# Patient Record
Sex: FEMALE | Race: WHITE | NOT HISPANIC OR LATINO | Employment: FULL TIME | URBAN - METROPOLITAN AREA
[De-identification: names, ages, dates, MRNs, and addresses within clinical notes are randomized per-mention and may not be internally consistent; named-entity substitution may affect disease eponyms.]

---

## 2023-12-08 ENCOUNTER — LAB REQUISITION (OUTPATIENT)
Dept: LAB | Facility: HOSPITAL | Age: 43
End: 2023-12-08
Payer: COMMERCIAL

## 2023-12-08 DIAGNOSIS — R30.0 DYSURIA: ICD-10-CM

## 2023-12-08 PROCEDURE — 87086 URINE CULTURE/COLONY COUNT: CPT

## 2023-12-10 LAB — BACTERIA UR CULT: NO GROWTH

## 2024-01-21 ENCOUNTER — HOSPITAL ENCOUNTER (EMERGENCY)
Facility: HOSPITAL | Age: 44
Discharge: HOME | End: 2024-01-21
Attending: STUDENT IN AN ORGANIZED HEALTH CARE EDUCATION/TRAINING PROGRAM
Payer: COMMERCIAL

## 2024-01-21 ENCOUNTER — APPOINTMENT (OUTPATIENT)
Dept: CARDIOLOGY | Facility: HOSPITAL | Age: 44
End: 2024-01-21
Payer: COMMERCIAL

## 2024-01-21 ENCOUNTER — APPOINTMENT (OUTPATIENT)
Dept: RADIOLOGY | Facility: HOSPITAL | Age: 44
End: 2024-01-21
Payer: COMMERCIAL

## 2024-01-21 VITALS
OXYGEN SATURATION: 100 % | BODY MASS INDEX: 25.92 KG/M2 | RESPIRATION RATE: 15 BRPM | HEIGHT: 69 IN | DIASTOLIC BLOOD PRESSURE: 89 MMHG | TEMPERATURE: 97.2 F | HEART RATE: 94 BPM | SYSTOLIC BLOOD PRESSURE: 118 MMHG | WEIGHT: 175 LBS

## 2024-01-21 DIAGNOSIS — R07.9 CHEST PAIN, UNSPECIFIED TYPE: Primary | ICD-10-CM

## 2024-01-21 LAB
ALBUMIN SERPL BCP-MCNC: 3.7 G/DL (ref 3.4–5)
ALP SERPL-CCNC: 44 U/L (ref 33–110)
ALT SERPL W P-5'-P-CCNC: 18 U/L (ref 7–45)
ANION GAP SERPL CALC-SCNC: 13 MMOL/L (ref 10–20)
AST SERPL W P-5'-P-CCNC: 19 U/L (ref 9–39)
BASOPHILS # BLD AUTO: 0.05 X10*3/UL (ref 0–0.1)
BASOPHILS NFR BLD AUTO: 0.9 %
BILIRUB SERPL-MCNC: 0.4 MG/DL (ref 0–1.2)
BUN SERPL-MCNC: 12 MG/DL (ref 6–23)
CALCIUM SERPL-MCNC: 8.3 MG/DL (ref 8.6–10.3)
CARDIAC TROPONIN I PNL SERPL HS: 4 NG/L (ref 0–13)
CARDIAC TROPONIN I PNL SERPL HS: 5 NG/L (ref 0–13)
CHLORIDE SERPL-SCNC: 106 MMOL/L (ref 98–107)
CO2 SERPL-SCNC: 25 MMOL/L (ref 21–32)
CREAT SERPL-MCNC: 0.67 MG/DL (ref 0.5–1.05)
D DIMER PPP FEU-MCNC: 301 NG/ML FEU
EGFRCR SERPLBLD CKD-EPI 2021: >90 ML/MIN/1.73M*2
EOSINOPHIL # BLD AUTO: 0.43 X10*3/UL (ref 0–0.7)
EOSINOPHIL NFR BLD AUTO: 8.1 %
ERYTHROCYTE [DISTWIDTH] IN BLOOD BY AUTOMATED COUNT: 14.9 % (ref 11.5–14.5)
GLUCOSE SERPL-MCNC: 137 MG/DL (ref 74–99)
HCT VFR BLD AUTO: 35.1 % (ref 36–46)
HGB BLD-MCNC: 11.4 G/DL (ref 12–16)
HOLD SPECIMEN: NORMAL
IMM GRANULOCYTES # BLD AUTO: 0.01 X10*3/UL (ref 0–0.7)
IMM GRANULOCYTES NFR BLD AUTO: 0.2 % (ref 0–0.9)
LYMPHOCYTES # BLD AUTO: 0.81 X10*3/UL (ref 1.2–4.8)
LYMPHOCYTES NFR BLD AUTO: 15.3 %
MAGNESIUM SERPL-MCNC: 1.81 MG/DL (ref 1.6–2.4)
MCH RBC QN AUTO: 29.2 PG (ref 26–34)
MCHC RBC AUTO-ENTMCNC: 32.5 G/DL (ref 32–36)
MCV RBC AUTO: 90 FL (ref 80–100)
MONOCYTES # BLD AUTO: 0.7 X10*3/UL (ref 0.1–1)
MONOCYTES NFR BLD AUTO: 13.2 %
NEUTROPHILS # BLD AUTO: 3.31 X10*3/UL (ref 1.2–7.7)
NEUTROPHILS NFR BLD AUTO: 62.3 %
NRBC BLD-RTO: 0 /100 WBCS (ref 0–0)
PHOSPHATE SERPL-MCNC: 1.6 MG/DL (ref 2.5–4.9)
PLATELET # BLD AUTO: 269 X10*3/UL (ref 150–450)
POTASSIUM SERPL-SCNC: 3 MMOL/L (ref 3.5–5.3)
PROT SERPL-MCNC: 6 G/DL (ref 6.4–8.2)
RBC # BLD AUTO: 3.91 X10*6/UL (ref 4–5.2)
SODIUM SERPL-SCNC: 141 MMOL/L (ref 136–145)
TSH SERPL-ACNC: 1.76 MIU/L (ref 0.44–3.98)
WBC # BLD AUTO: 5.3 X10*3/UL (ref 4.4–11.3)

## 2024-01-21 PROCEDURE — 99284 EMERGENCY DEPT VISIT MOD MDM: CPT | Performed by: STUDENT IN AN ORGANIZED HEALTH CARE EDUCATION/TRAINING PROGRAM

## 2024-01-21 PROCEDURE — 71045 X-RAY EXAM CHEST 1 VIEW: CPT

## 2024-01-21 PROCEDURE — 85025 COMPLETE CBC W/AUTO DIFF WBC: CPT | Performed by: STUDENT IN AN ORGANIZED HEALTH CARE EDUCATION/TRAINING PROGRAM

## 2024-01-21 PROCEDURE — 71045 X-RAY EXAM CHEST 1 VIEW: CPT | Performed by: RADIOLOGY

## 2024-01-21 PROCEDURE — 36415 COLL VENOUS BLD VENIPUNCTURE: CPT | Performed by: STUDENT IN AN ORGANIZED HEALTH CARE EDUCATION/TRAINING PROGRAM

## 2024-01-21 PROCEDURE — 83735 ASSAY OF MAGNESIUM: CPT | Performed by: STUDENT IN AN ORGANIZED HEALTH CARE EDUCATION/TRAINING PROGRAM

## 2024-01-21 PROCEDURE — 99283 EMERGENCY DEPT VISIT LOW MDM: CPT | Mod: 25

## 2024-01-21 PROCEDURE — 84443 ASSAY THYROID STIM HORMONE: CPT | Performed by: STUDENT IN AN ORGANIZED HEALTH CARE EDUCATION/TRAINING PROGRAM

## 2024-01-21 PROCEDURE — 80053 COMPREHEN METABOLIC PANEL: CPT | Performed by: STUDENT IN AN ORGANIZED HEALTH CARE EDUCATION/TRAINING PROGRAM

## 2024-01-21 PROCEDURE — 85379 FIBRIN DEGRADATION QUANT: CPT | Performed by: STUDENT IN AN ORGANIZED HEALTH CARE EDUCATION/TRAINING PROGRAM

## 2024-01-21 PROCEDURE — 2500000001 HC RX 250 WO HCPCS SELF ADMINISTERED DRUGS (ALT 637 FOR MEDICARE OP): Performed by: STUDENT IN AN ORGANIZED HEALTH CARE EDUCATION/TRAINING PROGRAM

## 2024-01-21 PROCEDURE — 84100 ASSAY OF PHOSPHORUS: CPT | Performed by: STUDENT IN AN ORGANIZED HEALTH CARE EDUCATION/TRAINING PROGRAM

## 2024-01-21 PROCEDURE — 93005 ELECTROCARDIOGRAM TRACING: CPT

## 2024-01-21 PROCEDURE — 84484 ASSAY OF TROPONIN QUANT: CPT | Performed by: STUDENT IN AN ORGANIZED HEALTH CARE EDUCATION/TRAINING PROGRAM

## 2024-01-21 RX ORDER — SODIUM,POTASSIUM PHOSPHATES 280-250MG
1 POWDER IN PACKET (EA) ORAL ONCE
Status: COMPLETED | OUTPATIENT
Start: 2024-01-21 | End: 2024-01-21

## 2024-01-21 RX ADMIN — POTASSIUM & SODIUM PHOSPHATES POWDER PACK 280-160-250 MG 1 PACKET: 280-160-250 PACK at 22:41

## 2024-01-21 ASSESSMENT — COLUMBIA-SUICIDE SEVERITY RATING SCALE - C-SSRS
6. HAVE YOU EVER DONE ANYTHING, STARTED TO DO ANYTHING, OR PREPARED TO DO ANYTHING TO END YOUR LIFE?: NO
2. HAVE YOU ACTUALLY HAD ANY THOUGHTS OF KILLING YOURSELF?: NO
1. IN THE PAST MONTH, HAVE YOU WISHED YOU WERE DEAD OR WISHED YOU COULD GO TO SLEEP AND NOT WAKE UP?: NO

## 2024-01-21 ASSESSMENT — PAIN DESCRIPTION - LOCATION: LOCATION: CHEST

## 2024-01-21 ASSESSMENT — PAIN SCALES - GENERAL
PAINLEVEL_OUTOF10: 0 - NO PAIN
PAINLEVEL_OUTOF10: 4

## 2024-01-21 ASSESSMENT — PAIN - FUNCTIONAL ASSESSMENT: PAIN_FUNCTIONAL_ASSESSMENT: 0-10

## 2024-01-22 LAB
ATRIAL RATE: 90 BPM
P AXIS: 23 DEGREES
P OFFSET: 192 MS
P ONSET: 159 MS
PR INTERVAL: 118 MS
Q ONSET: 218 MS
QRS COUNT: 15 BEATS
QRS DURATION: 94 MS
QT INTERVAL: 424 MS
QTC CALCULATION(BAZETT): 518 MS
QTC FREDERICIA: 485 MS
R AXIS: 55 DEGREES
T AXIS: 55 DEGREES
T OFFSET: 430 MS
VENTRICULAR RATE: 90 BPM

## 2024-01-23 ENCOUNTER — OFFICE VISIT (OUTPATIENT)
Dept: PRIMARY CARE | Facility: CLINIC | Age: 44
End: 2024-01-23
Payer: COMMERCIAL

## 2024-01-23 VITALS
BODY MASS INDEX: 24.14 KG/M2 | HEART RATE: 120 BPM | TEMPERATURE: 97.1 F | DIASTOLIC BLOOD PRESSURE: 68 MMHG | OXYGEN SATURATION: 98 % | HEIGHT: 69 IN | SYSTOLIC BLOOD PRESSURE: 104 MMHG | WEIGHT: 163 LBS

## 2024-01-23 DIAGNOSIS — M25.551 RIGHT HIP PAIN: Primary | ICD-10-CM

## 2024-01-23 DIAGNOSIS — R31.9 HEMATURIA, UNSPECIFIED TYPE: ICD-10-CM

## 2024-01-23 DIAGNOSIS — K21.9 GASTROESOPHAGEAL REFLUX DISEASE WITHOUT ESOPHAGITIS: ICD-10-CM

## 2024-01-23 DIAGNOSIS — I48.0 PAF (PAROXYSMAL ATRIAL FIBRILLATION) (MULTI): ICD-10-CM

## 2024-01-23 PROCEDURE — 1036F TOBACCO NON-USER: CPT | Performed by: FAMILY MEDICINE

## 2024-01-23 PROCEDURE — 99204 OFFICE O/P NEW MOD 45 MIN: CPT | Performed by: FAMILY MEDICINE

## 2024-01-23 RX ORDER — ESOMEPRAZOLE MAGNESIUM 40 MG/1
40 CAPSULE, DELAYED RELEASE ORAL EVERY 24 HOURS
Qty: 90 CAPSULE | Refills: 3 | Status: CANCELLED | OUTPATIENT
Start: 2024-01-23

## 2024-01-23 RX ORDER — ESOMEPRAZOLE MAGNESIUM 40 MG/1
CAPSULE, DELAYED RELEASE ORAL EVERY 24 HOURS
COMMUNITY
Start: 2023-02-28 | End: 2024-01-24 | Stop reason: SDUPTHER

## 2024-01-23 RX ORDER — METOPROLOL SUCCINATE 25 MG/1
25 TABLET, EXTENDED RELEASE ORAL DAILY
Qty: 90 TABLET | Refills: 3 | Status: CANCELLED | OUTPATIENT
Start: 2024-01-23

## 2024-01-23 RX ORDER — IBUPROFEN 800 MG/1
800 TABLET ORAL EVERY 8 HOURS PRN
Qty: 90 TABLET | Refills: 3 | Status: CANCELLED | OUTPATIENT
Start: 2024-01-23

## 2024-01-23 RX ORDER — METOPROLOL SUCCINATE 25 MG/1
TABLET, EXTENDED RELEASE ORAL
COMMUNITY
Start: 2021-12-13 | End: 2024-01-24 | Stop reason: SDUPTHER

## 2024-01-23 RX ORDER — ASPIRIN 81 MG/1
81 TABLET ORAL
COMMUNITY
Start: 2021-12-13 | End: 2024-01-24 | Stop reason: SDUPTHER

## 2024-01-23 RX ORDER — IBUPROFEN 800 MG/1
TABLET ORAL
COMMUNITY
Start: 2022-07-21 | End: 2024-01-24 | Stop reason: SDUPTHER

## 2024-01-23 NOTE — PROGRESS NOTES
Subjective   Patient ID: Vandana Rankin is a 43 y.o. female who presents for Headache (STABBING/), Chest Pain ([facility]-CHEST XRAYS, LABS, EKG. CONTINUED SYMPTOMS-HX OF HEART PROBLEMS.  ALSO C/O ITCHY SKIN PRIOR TO EPISODES. UNDER CHEST AND INTO BACK. DXD WITH AFIB IN THE PAST. Episodes also noted after eating.), and Med Refill (Metoprolol- out of meds for 30 days currently BP in office today 104/68).    HPI JUST IN ER WITH CP NEGATIVE WORK UP YESTERDAY  REPORTEDLY AFIB AND ON B BLOCKER   PATIENT PRESENTS NEW TO GET ESTABLISHED WITH NUMEROUS ISSUES /stabbing headaches and chest pains   Orthopedic issues and others   JUST IN ER /LABS AND OTHER TESTING REVIEWED     Review of Systems   Constitutional:  Positive for fatigue. Negative for activity change, appetite change, fever and unexpected weight change.   HENT:  Negative for congestion, ear pain, postnasal drip, rhinorrhea, sinus pressure and sore throat.    Eyes:  Negative for discharge, itching and visual disturbance.   Respiratory:  Negative for cough, shortness of breath and wheezing.    Cardiovascular:  Positive for chest pain. Negative for palpitations and leg swelling.   Gastrointestinal:  Positive for abdominal distention and abdominal pain. Negative for blood in stool, constipation, diarrhea, nausea and vomiting.        Gerd and off her rx meds   No scopes ever   Endocrine: Negative for cold intolerance, heat intolerance and polyuria.   Genitourinary:  Positive for hematuria. Negative for dysuria and flank pain.        Hematuria found in er and this repeated   BLOOD IN URINE PATIENT IS HAVING MENSES (I HAVE A TAMPON IN)   Musculoskeletal:  Positive for arthralgias. Negative for gait problem, joint swelling and myalgias.        RIGHT HIP PAIN    Skin:  Negative for rash.   Allergic/Immunologic: Negative for environmental allergies and food allergies.   Neurological:  Positive for headaches. Negative for dizziness, syncope, weakness, light-headedness and  "numbness.   Psychiatric/Behavioral:  Negative for dysphoric mood and sleep disturbance. The patient is nervous/anxious.         RUSHED SPEECH AND OFTEN FROM ONE ISSUE TO NEXT        Objective   /68   Pulse (!) 120   Temp 36.2 °C (97.1 °F)   Ht 1.746 m (5' 8.75\")   Wt 73.9 kg (163 lb)   SpO2 98%   BMI 24.25 kg/m²     Physical Exam  Vitals and nursing note reviewed.   Constitutional:       Appearance: Normal appearance.   HENT:      Head: Normocephalic.      Mouth/Throat:      Mouth: Mucous membranes are moist.   Cardiovascular:      Rate and Rhythm: Normal rate. Rhythm irregular.      Pulses: Normal pulses.      Heart sounds: Normal heart sounds. No murmur heard.     No friction rub. No gallop.   Pulmonary:      Effort: Pulmonary effort is normal. No respiratory distress.      Breath sounds: Normal breath sounds. No wheezing.   Abdominal:      General: Bowel sounds are normal. There is no distension.      Palpations: Abdomen is soft.      Tenderness: There is abdominal tenderness. There is no right CVA tenderness or left CVA tenderness.      Comments: MID EPIGASTRIC TENDERNESS AND AROUND RIBS    Musculoskeletal:         General: No deformity. Normal range of motion.   Skin:     General: Skin is warm and dry.      Capillary Refill: Capillary refill takes less than 2 seconds.   Neurological:      General: No focal deficit present.      Mental Status: She is alert and oriented to person, place, and time.   Psychiatric:      Comments: ANXIOUS AFFECT AND MOOD   REDIRECTED PATIENT OFTEN TO HER MAIN ISSUES FOR TODAY  I WILL FOLLOW SOON AND ADDRESS OTHERS   MEDICATION REFILLED          Assessment/Plan   Diagnoses and all orders for this visit:  Right hip pain  -     XR hip right with pelvis when performed 1 view; Future  Gastroesophageal reflux disease without esophagitis  PAF (paroxysmal atrial fibrillation) (CMS/[facility])  Hematuria, unspecified type  -     POCT UA Automated manually resulted         "

## 2024-01-24 DIAGNOSIS — R51.9 NONINTRACTABLE HEADACHE, UNSPECIFIED CHRONICITY PATTERN, UNSPECIFIED HEADACHE TYPE: ICD-10-CM

## 2024-01-24 DIAGNOSIS — K21.9 GASTROESOPHAGEAL REFLUX DISEASE WITHOUT ESOPHAGITIS: Primary | ICD-10-CM

## 2024-01-24 DIAGNOSIS — I48.0 PAF (PAROXYSMAL ATRIAL FIBRILLATION) (MULTI): ICD-10-CM

## 2024-01-25 RX ORDER — METOPROLOL SUCCINATE 25 MG/1
25 TABLET, EXTENDED RELEASE ORAL 2 TIMES DAILY
Qty: 60 TABLET | Refills: 2 | Status: SHIPPED | OUTPATIENT
Start: 2024-01-25 | End: 2024-01-26 | Stop reason: SDUPTHER

## 2024-01-25 RX ORDER — ASPIRIN 81 MG/1
81 TABLET ORAL
Qty: 30 TABLET | Refills: 2 | Status: SHIPPED | OUTPATIENT
Start: 2024-01-25 | End: 2024-03-14 | Stop reason: SDUPTHER

## 2024-01-25 RX ORDER — ESOMEPRAZOLE MAGNESIUM 40 MG/1
40 CAPSULE, DELAYED RELEASE ORAL EVERY 24 HOURS
Qty: 30 CAPSULE | Refills: 2 | Status: SHIPPED | OUTPATIENT
Start: 2024-01-25 | End: 2024-03-14 | Stop reason: SDUPTHER

## 2024-01-25 RX ORDER — IBUPROFEN 800 MG/1
800 TABLET ORAL EVERY 8 HOURS PRN
Qty: 270 TABLET | Refills: 0 | Status: SHIPPED | OUTPATIENT
Start: 2024-01-25 | End: 2024-04-24

## 2024-01-26 NOTE — TELEPHONE ENCOUNTER
Pharmacy called requesting a 90 day supply for the metoprolol. Would like new script sent reflecting this.

## 2024-01-27 LAB
ATRIAL RATE: 79 BPM
P AXIS: 44 DEGREES
P OFFSET: 188 MS
P ONSET: 132 MS
PR INTERVAL: 172 MS
Q ONSET: 218 MS
QRS COUNT: 13 BEATS
QRS DURATION: 98 MS
QT INTERVAL: 440 MS
QTC CALCULATION(BAZETT): 504 MS
QTC FREDERICIA: 482 MS
R AXIS: 45 DEGREES
T AXIS: 45 DEGREES
T OFFSET: 438 MS
VENTRICULAR RATE: 79 BPM

## 2024-01-28 PROBLEM — K21.9 GASTROESOPHAGEAL REFLUX DISEASE WITHOUT ESOPHAGITIS: Status: ACTIVE | Noted: 2024-01-28

## 2024-01-28 PROBLEM — I48.0 PAF (PAROXYSMAL ATRIAL FIBRILLATION) (MULTI): Status: ACTIVE | Noted: 2024-01-28

## 2024-01-28 PROBLEM — R31.9 HEMATURIA: Status: ACTIVE | Noted: 2024-01-28

## 2024-01-28 PROBLEM — M25.551 RIGHT HIP PAIN: Status: ACTIVE | Noted: 2024-01-28

## 2024-01-28 RX ORDER — METOPROLOL SUCCINATE 25 MG/1
25 TABLET, EXTENDED RELEASE ORAL 2 TIMES DAILY
Qty: 180 TABLET | Refills: 0 | Status: SHIPPED | OUTPATIENT
Start: 2024-01-28 | End: 2024-03-14 | Stop reason: SDUPTHER

## 2024-01-28 ASSESSMENT — ENCOUNTER SYMPTOMS
BLOOD IN STOOL: 0
WHEEZING: 0
ABDOMINAL PAIN: 1
EYE DISCHARGE: 0
HEMATURIA: 1
FEVER: 0
RHINORRHEA: 0
UNEXPECTED WEIGHT CHANGE: 0
SHORTNESS OF BREATH: 0
ABDOMINAL DISTENTION: 1
COUGH: 0
CONSTIPATION: 0
ARTHRALGIAS: 1
PALPITATIONS: 0
APPETITE CHANGE: 0
DYSPHORIC MOOD: 0
NUMBNESS: 0
NERVOUS/ANXIOUS: 1
NAUSEA: 0
WEAKNESS: 0
SLEEP DISTURBANCE: 0
EYE ITCHING: 0
FATIGUE: 1
FLANK PAIN: 0
ACTIVITY CHANGE: 0
VOMITING: 0
HEADACHES: 1
LIGHT-HEADEDNESS: 0
DIZZINESS: 0
DIARRHEA: 0
DYSURIA: 0
MYALGIAS: 0
JOINT SWELLING: 0
SINUS PRESSURE: 0
SORE THROAT: 0

## 2024-01-29 DIAGNOSIS — I48.0 PAF (PAROXYSMAL ATRIAL FIBRILLATION) (MULTI): ICD-10-CM

## 2024-01-29 NOTE — ED PROVIDER NOTES
Chief Complaint: Chest pain  HPI: This is a 43-year-old female, past medical history significant for paroxysmal atrial fibrillation, GERD, presenting to the emergency department for chest pain which began about 1 hour prior to arrival.  Patient states that she has had similar pain in the past over the last few weeks, however the pain today was much worse and so she presents to the emergency department.  Pain has no aggravating or alleviating factors, and is midsternal.  She also complains of some palpitations.  Pain is not associated with any exertion, shortness of breath, diaphoresis, dizziness, numbness, tingling.    No past medical history on file.   Past Surgical History:   Procedure Laterality Date     SECTION, LOW TRANSVERSE         Physical Exam  Constitutional:       Appearance: Normal appearance.   HENT:      Head: Normocephalic and atraumatic.      Mouth/Throat:      Mouth: Mucous membranes are moist.   Eyes:      Conjunctiva/sclera: Conjunctivae normal.   Cardiovascular:      Rate and Rhythm: Normal rate.   Pulmonary:      Effort: Pulmonary effort is normal.   Abdominal:      General: Abdomen is flat.      Palpations: Abdomen is soft.   Musculoskeletal:         General: Normal range of motion.      Cervical back: Normal range of motion.   Skin:     General: Skin is warm and dry.   Neurological:      General: No focal deficit present.      Mental Status: She is alert.   Psychiatric:         Mood and Affect: Mood normal.            ED Course/MDM  Diagnoses as of 24 1638   Chest pain, unspecified type     EKG interpreted by myself (ED attending physician): Normal sinus rhythm, rate of 90, normal axis, slightly prolonged QT interval, no ST segment changes, nonischemic EKG  Normal sinus rhythm, rate of 79, normal axis, prolonged QT interval, no ST segment changes, nonischemic EKG.      This is a 43 y.o. female presenting to the ED with chest pain which began about an hour prior to arrival.   Patient states that the pain is in the middle of her chest, is nonradiating, has no aggravating or alleviating factors, is not associated with nausea, vomiting, dizziness, shortness of breath, diaphoresis.  She states that she has had similar pains in the past.  On physical exam, the patient is resting comfortably in the bed, no acute distress.  Heart is regular rate and rhythm, lungs are clear to auscultation bilaterally.  Vital signs are stable.  EKG is nonischemic.  Chest x-ray is nonconcerning for any acute cardiopulmonary pathology lab work was concerning for hypokalemia as well as hypophosphatemia, both of which were repleted in the emergency department.  Troponin x 2 is negative.  D-dimer is negative.  TSH is negative.  It is overall unclear the cause of the patient's chest pain, however I do feel that she is safe and stable for outpatient follow-up.  She was given cardiology information and advised to call her PCP.  She was advised to return immediately to the emergency department for any worsening symptoms and was discharged home in stable condition.    Final Impression  1.  Chest pain   Disposition/Plan: discharged home  Condition at disposition: Stable.     Lucia Brown DO  Emergency Medicine Physician     Lucia Brown,   01/29/24 9500

## 2024-01-30 ENCOUNTER — APPOINTMENT (OUTPATIENT)
Dept: PRIMARY CARE | Facility: CLINIC | Age: 44
End: 2024-01-30
Payer: COMMERCIAL

## 2024-01-30 RX ORDER — METOPROLOL SUCCINATE 25 MG/1
25 TABLET, EXTENDED RELEASE ORAL 2 TIMES DAILY
Qty: 180 TABLET | Refills: 1 | OUTPATIENT
Start: 2024-01-30 | End: 2024-04-29

## 2024-01-31 ENCOUNTER — APPOINTMENT (OUTPATIENT)
Dept: PRIMARY CARE | Facility: CLINIC | Age: 44
End: 2024-01-31
Payer: COMMERCIAL

## 2024-02-01 ENCOUNTER — OFFICE VISIT (OUTPATIENT)
Dept: PRIMARY CARE | Facility: CLINIC | Age: 44
End: 2024-02-01
Payer: COMMERCIAL

## 2024-02-01 ENCOUNTER — TELEPHONE (OUTPATIENT)
Dept: PRIMARY CARE | Facility: CLINIC | Age: 44
End: 2024-02-01

## 2024-02-01 VITALS
BODY MASS INDEX: 24.14 KG/M2 | HEART RATE: 96 BPM | OXYGEN SATURATION: 98 % | TEMPERATURE: 98.1 F | SYSTOLIC BLOOD PRESSURE: 118 MMHG | WEIGHT: 163 LBS | DIASTOLIC BLOOD PRESSURE: 70 MMHG | HEIGHT: 69 IN

## 2024-02-01 DIAGNOSIS — R07.9 CHEST PAIN, UNSPECIFIED TYPE: Primary | ICD-10-CM

## 2024-02-01 DIAGNOSIS — Z00.00 ANNUAL PHYSICAL EXAM: ICD-10-CM

## 2024-02-01 DIAGNOSIS — Z12.31 ENCOUNTER FOR SCREENING MAMMOGRAM FOR BREAST CANCER: ICD-10-CM

## 2024-02-01 DIAGNOSIS — Z12.31 ENCOUNTER FOR SCREENING MAMMOGRAM FOR BREAST CANCER: Primary | ICD-10-CM

## 2024-02-01 PROCEDURE — 99213 OFFICE O/P EST LOW 20 MIN: CPT

## 2024-02-01 PROCEDURE — 1036F TOBACCO NON-USER: CPT

## 2024-02-01 ASSESSMENT — ENCOUNTER SYMPTOMS
PALPITATIONS: 0
ACTIVITY CHANGE: 0
HEADACHES: 0
DIZZINESS: 0
DYSURIA: 0
CHEST TIGHTNESS: 0
SHORTNESS OF BREATH: 0
APPETITE CHANGE: 0
DIARRHEA: 0
LIGHT-HEADEDNESS: 0
NAUSEA: 0
VOMITING: 0
COUGH: 0
WEAKNESS: 0
UNEXPECTED WEIGHT CHANGE: 0
ABDOMINAL PAIN: 0
FREQUENCY: 0
COLOR CHANGE: 0
FATIGUE: 0
CONSTIPATION: 0

## 2024-02-01 ASSESSMENT — PATIENT HEALTH QUESTIONNAIRE - PHQ9
2. FEELING DOWN, DEPRESSED OR HOPELESS: NOT AT ALL
SUM OF ALL RESPONSES TO PHQ9 QUESTIONS 1 AND 2: 0
1. LITTLE INTEREST OR PLEASURE IN DOING THINGS: NOT AT ALL

## 2024-02-01 ASSESSMENT — PAIN SCALES - GENERAL: PAINLEVEL: 0-NO PAIN

## 2024-02-01 NOTE — PROGRESS NOTES
"Subjective   Patient ID: Vandana Rankin is a 43 y.o. female who presents for Follow-up (ER- chest pains/).    NITIN Ross presents for follow up after ER visit for chest pain on 1/21/2024. Notes, labs, and imaging reviewed for pertinence to this visit, labs and imaging unremarkable for significant cardiac event.  She is a new patient to this provider.  She presented in the ER for mid-sternal chest pain that had begun about 1 hour prior after eating Vikas soup.  She reports she had experienced several of these episodes but that particular day it was sharper in nature. Episodes of chest pain each occurred after eating, lasting for 30 minutes to up to 3 hours.  Typically takes esomeprazole daily but had been out when she was experiencing the episodes of chest pain. She reports she has refilled her prescription and is now taking as prescribed.  She does have history of a-fib.  Takes metoprolol XL daily. Sees Dr. Gardiner, last visit 1 year ago. Denies any palpitations, dizziness, or symptoms associated with A-fib. EKG in ER showed NSR, slightly prolonged Qtc. No further chest pain since ER visit per patient.     Review of Systems   Constitutional:  Negative for activity change, appetite change, fatigue and unexpected weight change.   Eyes:  Negative for visual disturbance.   Respiratory:  Negative for cough, chest tightness and shortness of breath.    Cardiovascular:  Negative for chest pain, palpitations and leg swelling.   Gastrointestinal:  Negative for abdominal pain, constipation, diarrhea, nausea and vomiting.   Genitourinary:  Negative for dysuria, frequency and urgency.   Skin:  Negative for color change and rash.   Neurological:  Negative for dizziness, syncope, weakness, light-headedness and headaches.       Objective   /70 (BP Location: Left arm)   Pulse 96   Temp 36.7 °C (98.1 °F) (Temporal)   Ht 1.753 m (5' 9\")   Wt 73.9 kg (163 lb)   SpO2 98%   BMI 24.07 kg/m²     Physical Exam  Vitals and nursing " note reviewed.   Constitutional:       General: She is not in acute distress.     Appearance: Normal appearance.   Eyes:      Extraocular Movements: Extraocular movements intact.      Conjunctiva/sclera: Conjunctivae normal.      Pupils: Pupils are equal, round, and reactive to light.   Neck:      Vascular: No carotid bruit.   Cardiovascular:      Rate and Rhythm: Normal rate and regular rhythm.      Pulses: Normal pulses.      Heart sounds: Normal heart sounds, S1 normal and S2 normal. No murmur heard.  Pulmonary:      Effort: Pulmonary effort is normal.      Breath sounds: Normal breath sounds.   Musculoskeletal:      Cervical back: Normal range of motion and neck supple.      Right lower leg: No edema.      Left lower leg: No edema.   Lymphadenopathy:      Cervical: No cervical adenopathy.   Skin:     General: Skin is warm and dry.      Capillary Refill: Capillary refill takes less than 2 seconds.   Neurological:      General: No focal deficit present.      Mental Status: She is alert and oriented to person, place, and time. Mental status is at baseline.   Psychiatric:         Mood and Affect: Mood normal.         Behavior: Behavior normal.         Thought Content: Thought content normal.         Judgment: Judgment normal.         Assessment/Plan   Problem List Items Addressed This Visit    None  Visit Diagnoses         Codes    Chest pain, unspecified type    -  Primary  Acute, symptoms have resolved.   Chest pain was most likely a symptom of uncontrolled GERD as patient admits she had not been taking her medication at that time.  Discussed strict follow up precautions.  She is due for her annual CPE, will scheduled within the next 6 weeks, we will repeat lab work as some of her labs were abnormal during ER visit.  R07.9

## 2024-02-01 NOTE — TELEPHONE ENCOUNTER
PATIENT SAID SHE WAS SUPPOSED TO GET MAMMOGRAM ORDER AND SHE SAID PREVIOUSLY SHE WAS TOLD IT WOULD HAVE TO BE A DIAGNOSTIC MAMMOGRAM NOT JUST A SCREENING MAMMOGRAM

## 2024-02-27 ENCOUNTER — APPOINTMENT (OUTPATIENT)
Dept: RADIOLOGY | Facility: HOSPITAL | Age: 44
End: 2024-02-27
Payer: COMMERCIAL

## 2024-03-01 ENCOUNTER — HOSPITAL ENCOUNTER (OUTPATIENT)
Dept: RADIOLOGY | Facility: HOSPITAL | Age: 44
Discharge: HOME | End: 2024-03-01
Payer: COMMERCIAL

## 2024-03-01 VITALS — HEIGHT: 69 IN | WEIGHT: 165 LBS | BODY MASS INDEX: 24.44 KG/M2

## 2024-03-01 DIAGNOSIS — Z12.31 ENCOUNTER FOR SCREENING MAMMOGRAM FOR BREAST CANCER: ICD-10-CM

## 2024-03-01 PROCEDURE — 77062 BREAST TOMOSYNTHESIS BI: CPT

## 2024-03-01 PROCEDURE — 76642 ULTRASOUND BREAST LIMITED: CPT | Mod: RT

## 2024-03-01 PROCEDURE — 76982 USE 1ST TARGET LESION: CPT | Mod: RT

## 2024-03-08 ENCOUNTER — LAB (OUTPATIENT)
Dept: LAB | Facility: LAB | Age: 44
End: 2024-03-08
Payer: COMMERCIAL

## 2024-03-08 DIAGNOSIS — Z00.00 ANNUAL PHYSICAL EXAM: ICD-10-CM

## 2024-03-08 LAB
ALBUMIN SERPL BCP-MCNC: 4.2 G/DL (ref 3.4–5)
ALP SERPL-CCNC: 38 U/L (ref 33–110)
ALT SERPL W P-5'-P-CCNC: 13 U/L (ref 7–45)
AMORPH CRY #/AREA UR COMP ASSIST: ABNORMAL /HPF
ANION GAP SERPL CALC-SCNC: 12 MMOL/L (ref 10–20)
APPEARANCE UR: ABNORMAL
AST SERPL W P-5'-P-CCNC: 14 U/L (ref 9–39)
BACTERIA #/AREA URNS AUTO: ABNORMAL /HPF
BASOPHILS # BLD AUTO: 0.04 X10*3/UL (ref 0–0.1)
BASOPHILS NFR BLD AUTO: 0.7 %
BILIRUB SERPL-MCNC: 0.3 MG/DL (ref 0–1.2)
BILIRUB UR STRIP.AUTO-MCNC: NEGATIVE MG/DL
BUN SERPL-MCNC: 14 MG/DL (ref 6–23)
CALCIUM SERPL-MCNC: 8.9 MG/DL (ref 8.6–10.3)
CAOX CRY #/AREA UR COMP ASSIST: ABNORMAL /HPF
CHLORIDE SERPL-SCNC: 108 MMOL/L (ref 98–107)
CO2 SERPL-SCNC: 24 MMOL/L (ref 21–32)
COLOR UR: YELLOW
CREAT SERPL-MCNC: 0.57 MG/DL (ref 0.5–1.05)
EGFRCR SERPLBLD CKD-EPI 2021: >90 ML/MIN/1.73M*2
EOSINOPHIL # BLD AUTO: 0.17 X10*3/UL (ref 0–0.7)
EOSINOPHIL NFR BLD AUTO: 2.9 %
ERYTHROCYTE [DISTWIDTH] IN BLOOD BY AUTOMATED COUNT: 14.4 % (ref 11.5–14.5)
GLUCOSE SERPL-MCNC: 100 MG/DL (ref 74–99)
GLUCOSE UR STRIP.AUTO-MCNC: NEGATIVE MG/DL
HCT VFR BLD AUTO: 35.5 % (ref 36–46)
HGB BLD-MCNC: 11.5 G/DL (ref 12–16)
IMM GRANULOCYTES # BLD AUTO: 0.01 X10*3/UL (ref 0–0.7)
IMM GRANULOCYTES NFR BLD AUTO: 0.2 % (ref 0–0.9)
KETONES UR STRIP.AUTO-MCNC: NEGATIVE MG/DL
LEUKOCYTE ESTERASE UR QL STRIP.AUTO: ABNORMAL
LYMPHOCYTES # BLD AUTO: 1.85 X10*3/UL (ref 1.2–4.8)
LYMPHOCYTES NFR BLD AUTO: 31.7 %
MCH RBC QN AUTO: 28.8 PG (ref 26–34)
MCHC RBC AUTO-ENTMCNC: 32.4 G/DL (ref 32–36)
MCV RBC AUTO: 89 FL (ref 80–100)
MONOCYTES # BLD AUTO: 0.48 X10*3/UL (ref 0.1–1)
MONOCYTES NFR BLD AUTO: 8.2 %
MUCOUS THREADS #/AREA URNS AUTO: ABNORMAL /LPF
NEUTROPHILS # BLD AUTO: 3.28 X10*3/UL (ref 1.2–7.7)
NEUTROPHILS NFR BLD AUTO: 56.3 %
NITRITE UR QL STRIP.AUTO: NEGATIVE
NRBC BLD-RTO: 0 /100 WBCS (ref 0–0)
PH UR STRIP.AUTO: 5 [PH]
PLATELET # BLD AUTO: 283 X10*3/UL (ref 150–450)
POTASSIUM SERPL-SCNC: 3.5 MMOL/L (ref 3.5–5.3)
PROT SERPL-MCNC: 6.7 G/DL (ref 6.4–8.2)
PROT UR STRIP.AUTO-MCNC: NEGATIVE MG/DL
RBC # BLD AUTO: 4 X10*6/UL (ref 4–5.2)
RBC # UR STRIP.AUTO: NEGATIVE /UL
RBC #/AREA URNS AUTO: ABNORMAL /HPF
SODIUM SERPL-SCNC: 140 MMOL/L (ref 136–145)
SP GR UR STRIP.AUTO: 1.02
SQUAMOUS #/AREA URNS AUTO: ABNORMAL /HPF
UROBILINOGEN UR STRIP.AUTO-MCNC: <2 MG/DL
WBC # BLD AUTO: 5.8 X10*3/UL (ref 4.4–11.3)
WBC #/AREA URNS AUTO: ABNORMAL /HPF

## 2024-03-08 PROCEDURE — 36415 COLL VENOUS BLD VENIPUNCTURE: CPT

## 2024-03-14 ENCOUNTER — OFFICE VISIT (OUTPATIENT)
Dept: PRIMARY CARE | Facility: CLINIC | Age: 44
End: 2024-03-14
Payer: COMMERCIAL

## 2024-03-14 VITALS
SYSTOLIC BLOOD PRESSURE: 126 MMHG | OXYGEN SATURATION: 98 % | HEIGHT: 69 IN | WEIGHT: 170.2 LBS | HEART RATE: 82 BPM | DIASTOLIC BLOOD PRESSURE: 72 MMHG | BODY MASS INDEX: 25.21 KG/M2

## 2024-03-14 DIAGNOSIS — Z00.00 ANNUAL PHYSICAL EXAM: Primary | ICD-10-CM

## 2024-03-14 DIAGNOSIS — I48.0 PAF (PAROXYSMAL ATRIAL FIBRILLATION) (MULTI): ICD-10-CM

## 2024-03-14 DIAGNOSIS — K21.9 GASTROESOPHAGEAL REFLUX DISEASE WITHOUT ESOPHAGITIS: ICD-10-CM

## 2024-03-14 PROCEDURE — 1036F TOBACCO NON-USER: CPT

## 2024-03-14 PROCEDURE — 99396 PREV VISIT EST AGE 40-64: CPT

## 2024-03-14 RX ORDER — ESOMEPRAZOLE MAGNESIUM 40 MG/1
40 CAPSULE, DELAYED RELEASE ORAL EVERY 24 HOURS
Qty: 90 CAPSULE | Refills: 3 | Status: SHIPPED | OUTPATIENT
Start: 2024-03-14 | End: 2025-03-09

## 2024-03-14 RX ORDER — ASPIRIN 81 MG/1
81 TABLET ORAL
Qty: 90 TABLET | Refills: 3 | Status: SHIPPED | OUTPATIENT
Start: 2024-03-14 | End: 2025-03-09

## 2024-03-14 RX ORDER — METOPROLOL SUCCINATE 25 MG/1
25 TABLET, EXTENDED RELEASE ORAL 2 TIMES DAILY
Qty: 180 TABLET | Refills: 3 | Status: SHIPPED | OUTPATIENT
Start: 2024-03-14 | End: 2025-03-09

## 2024-03-14 ASSESSMENT — ENCOUNTER SYMPTOMS
NUMBNESS: 0
FATIGUE: 0
SINUS PRESSURE: 0
SEIZURES: 0
BRUISES/BLEEDS EASILY: 0
DIZZINESS: 0
WHEEZING: 0
DECREASED CONCENTRATION: 0
POLYPHAGIA: 0
SINUS PAIN: 0
ARTHRALGIAS: 0
ABDOMINAL PAIN: 0
VOMITING: 0
HYPERACTIVE: 0
CHILLS: 0
NERVOUS/ANXIOUS: 0
FEVER: 0
WEAKNESS: 0
EYE PAIN: 0
SLEEP DISTURBANCE: 0
DYSURIA: 0
TROUBLE SWALLOWING: 0
MYALGIAS: 0
PALPITATIONS: 0
APPETITE CHANGE: 0
DIARRHEA: 0
CONSTIPATION: 0
SORE THROAT: 0
SHORTNESS OF BREATH: 0
UNEXPECTED WEIGHT CHANGE: 0
ADENOPATHY: 0
ACTIVITY CHANGE: 0
NAUSEA: 0
BLOOD IN STOOL: 0
CHEST TIGHTNESS: 0
FREQUENCY: 0
COUGH: 0
SPEECH DIFFICULTY: 0
POLYDIPSIA: 0
COLOR CHANGE: 0
WOUND: 0
PHOTOPHOBIA: 0
HEADACHES: 0
JOINT SWELLING: 0

## 2024-03-14 ASSESSMENT — PATIENT HEALTH QUESTIONNAIRE - PHQ9
2. FEELING DOWN, DEPRESSED OR HOPELESS: NOT AT ALL
1. LITTLE INTEREST OR PLEASURE IN DOING THINGS: NOT AT ALL
SUM OF ALL RESPONSES TO PHQ9 QUESTIONS 1 AND 2: 0

## 2024-03-14 ASSESSMENT — VISUAL ACUITY: OU: 1

## 2024-03-14 ASSESSMENT — PAIN SCALES - GENERAL: PAINLEVEL: 2

## 2024-03-14 NOTE — PROGRESS NOTES
Subjective   Patient ID: Vandana Rankin is a 44 y.o. female who presents for Annual Exam.    HPI     Vandana Rankin presents for annual physical exam.     No new concerns at this visit.  No recent hospitalizations or illness.     Patient's recent visit notes, medication and allergy lists, past medical surgical social hx, immunization, vitals, problem list, recent tests were reviewed by me for pertinence to this visit.      PMH:   PAF (paroxysmal atrial fibrillation)- diagnosed in 2022 due to palpitations. Has seen cardiology in the past for this. Take metoprolol XL 25 mg daily. No palpitations or flutters with medications    GERD: esomeprazole 40 mg daily with good control    Social Hx:  , 2 children  Working, manager for [employer]  Smoking: No; ex-smoker (smoked 1 pack of cigarettes a day for 27 years, before she quit at the age of 39)   Alcohol: Yes - occasionally  Recreational drug use: Edible marijuana rarely- helps sleep      Screening tools:  EKG: Yes - 1/2024 in ER- Normal sinus rhythm, Prolonged QT (504)    Mammogram: Yes - abnormal mamm- 2/2024 with us follow up showing Suspicious lesion in the 10 o'clock position of the right breast  corresponding to the palpable lump. Biopsy recommended.  Biopsy ordered by breast [employer], scheduled for appointment on 3/26/2024 and then procedure on 4/1/2024.    PAP: Yes - saw gyn, thinks it was approximately 4 years ago- reports it was normal      Vaccinations:  Tdap: 2017; due 2027  Flu Vaccine: declines    Review of Systems   Constitutional:  Negative for activity change, appetite change, chills, fatigue, fever and unexpected weight change.   HENT:  Negative for dental problem, ear pain, hearing loss, mouth sores, nosebleeds, sinus pressure, sinus pain, sore throat and trouble swallowing.    Eyes:  Negative for photophobia, pain and visual disturbance.   Respiratory:  Negative for cough, chest tightness, shortness of breath and wheezing.    Cardiovascular:  Negative  "for chest pain, palpitations and leg swelling.   Gastrointestinal:  Negative for abdominal pain, blood in stool, constipation, diarrhea, nausea and vomiting.   Endocrine: Negative for cold intolerance, heat intolerance, polydipsia, polyphagia and polyuria.   Genitourinary:  Negative for decreased urine volume, dysuria, frequency and menstrual problem.   Musculoskeletal:  Negative for arthralgias, gait problem, joint swelling and myalgias.   Skin:  Negative for color change, rash and wound.   Allergic/Immunologic: Negative for environmental allergies and food allergies.   Neurological:  Negative for dizziness, seizures, syncope, speech difficulty, weakness, numbness and headaches.   Hematological:  Negative for adenopathy. Does not bruise/bleed easily.   Psychiatric/Behavioral:  Negative for behavioral problems, decreased concentration, self-injury, sleep disturbance and suicidal ideas. The patient is not nervous/anxious and is not hyperactive.            Objective   /72   Pulse 82   Ht 1.76 m (5' 9.29\")   Wt 77.2 kg (170 lb 3.2 oz)   LMP 03/14/2024 (Exact Date)   SpO2 98%   BMI 24.92 kg/m²     Physical Exam  Vitals and nursing note reviewed.   Constitutional:       General: She is not in acute distress.     Appearance: Normal appearance. She is well-developed and well-groomed.   HENT:      Head: Normocephalic.      Jaw: There is normal jaw occlusion.      Right Ear: Hearing, tympanic membrane, ear canal and external ear normal.      Left Ear: Hearing, tympanic membrane, ear canal and external ear normal.      Nose: Nose normal.      Mouth/Throat:      Lips: Pink.      Mouth: Mucous membranes are moist.      Pharynx: Oropharynx is clear. Uvula midline.   Eyes:      General: Lids are normal. Vision grossly intact. Gaze aligned appropriately.      Extraocular Movements: Extraocular movements intact.      Conjunctiva/sclera: Conjunctivae normal.      Pupils: Pupils are equal, round, and reactive to light. "   Neck:      Thyroid: No thyromegaly.      Vascular: No carotid bruit or JVD.      Trachea: Trachea and phonation normal.   Cardiovascular:      Rate and Rhythm: Normal rate and regular rhythm.      Pulses: Normal pulses.      Heart sounds: Normal heart sounds, S1 normal and S2 normal.   Pulmonary:      Effort: Pulmonary effort is normal.      Breath sounds: Normal breath sounds and air entry.   Abdominal:      General: Bowel sounds are normal. There is no distension.      Palpations: Abdomen is soft. There is no hepatomegaly, splenomegaly or mass.      Tenderness: There is no abdominal tenderness. There is no right CVA tenderness, left CVA tenderness or guarding.   Musculoskeletal:         General: Normal range of motion.      Cervical back: Normal, full passive range of motion without pain, normal range of motion and neck supple.      Thoracic back: Normal.      Lumbar back: Normal.      Right lower leg: No edema.      Left lower leg: No edema.   Lymphadenopathy:      Head:      Right side of head: No submental, submandibular, tonsillar, preauricular, posterior auricular or occipital adenopathy.      Left side of head: No submental, submandibular, tonsillar, preauricular, posterior auricular or occipital adenopathy.      Cervical: No cervical adenopathy.      Right cervical: No superficial or posterior cervical adenopathy.     Left cervical: No superficial or posterior cervical adenopathy.      Upper Body:      Right upper body: No supraclavicular adenopathy.      Left upper body: No supraclavicular adenopathy.   Skin:     General: Skin is warm and dry.      Capillary Refill: Capillary refill takes less than 2 seconds.   Neurological:      General: No focal deficit present.      Mental Status: She is alert and oriented to person, place, and time.      Cranial Nerves: Cranial nerves 2-12 are intact.      Sensory: Sensation is intact.      Motor: Motor function is intact.      Coordination: Coordination is intact.       Gait: Gait is intact.   Psychiatric:         Attention and Perception: Attention and perception normal.         Mood and Affect: Mood and affect normal.         Speech: Speech normal.         Behavior: Behavior normal. Behavior is cooperative.         Thought Content: Thought content normal.         Cognition and Memory: Cognition normal.         Judgment: Judgment normal.         Assessment/Plan   Problem List Items Addressed This Visit             ICD-10-CM    Gastroesophageal reflux disease without esophagitis K21.9     Chronic condition, stable with medication  Continue esomeprazole as prescribed.  Lifestyle interventions discussed.  Follow up in as needed if symptoms change or worsen.         Relevant Medications    esomeprazole (NexIUM) 40 mg DR capsule    PAF (paroxysmal atrial fibrillation) (CMS/[facility]) I48.0     Chronic condition, stable at this visit.  Follows with cardiology.  Continue metoprolol XL 25 mg daily and ASA 81 mg as prescribed by cardiology.          Relevant Medications    metoprolol succinate XL (Toprol-XL) 25 mg 24 hr tablet    aspirin 81 mg EC tablet     Other Visit Diagnoses         Codes    Annual physical exam    -  Primary  Well adult exam.  1. Age appropriate preventative measures reviewed.   2. Encouraged healthy diet and exercise.  3. Immunizations- Reviewed, tdap UTD, flu declined  4. Labs- reviewed with patient  5. Medications- Reviewed    *Follow-up in 1 year for repeat annual physical exam. Patient verbalizes understanding  regarding plan of care and all questions answered.   Z00.00

## 2024-03-17 NOTE — ASSESSMENT & PLAN NOTE
Chronic condition, stable with medication  Continue esomeprazole as prescribed.  Lifestyle interventions discussed.  Follow up in as needed if symptoms change or worsen.

## 2024-03-17 NOTE — ASSESSMENT & PLAN NOTE
Chronic condition, stable at this visit.  Follows with cardiology.  Continue metoprolol XL 25 mg daily and ASA 81 mg as prescribed by cardiology.

## 2024-03-23 NOTE — PROGRESS NOTES
BREAST SURGERY NEW PATIENT VISIT    Subjective   Vandana Rankin is a 44 y.o. female referred by Piper Perera CNP for a palpable right breast mass and an abnormal mammogram  She palpated a right breast mass about a month ago.  No skin changes or breast pain.  No nipple discharge.    Mammogram:   3/1/2024: Diagnostic bilateral mammogram and right breast ultrasound:  Density:  The breast tissue is extremely dense, which may limit the  sensitivity of mammography.      There is vaguely increased density in the area of the palpable lump  in the upper outer quadrant of the left breast, however no discrete  mass is visualized. Minute asymmetric density is noted  medially/posteriorly in the right breast.      Real-time targeted ultrasound of the right breast only was also  performed for further evaluation. In the 10 o'clock position of the  right breast, 12.2 cm from the nipple, in the area of the palpable  lump there is an oval low echogenicity lesion measuring 9.2 x 15.3 x  9.3 mm. Its height is more than is width. The borders appear to be  microlobulated. There is posterior shadowing. On Doppler ultrasound  there is no significant internal blood flow. Elastography images  demonstrate no significant associated fibrotic changes. No cystic or  solid lesion is identified along the medial aspect of the right  breast.      IMPRESSION:  Suspicious lesion in the 10 o'clock position of the right breast  corresponding to the palpable lump. Biopsy recommended.  Ultrasound-guided biopsy can be offered.      BI-RADS CATEGORY:      BI-RADS Category:  4 Suspicious.  Radiology review: All images and reports were personally reviewed.       Past Medical History: none    Non-smoker    GYN History:  Menarche: 12  Menopause: n/a  HRT: n/a  Birth control use: no  Fertility treatment: no  Hysterectomy: no  LMP: 3/20/24    G 3 P 2  Age at First Live Birth: 17  Breast-feedin child for 5 months    Previous biopsies: No  Previous breast  surgeries: No  Prior breast cancer: No    Family history:   Mother with lymphoma at 61,   Maternal grandmother with brain tumor at 70,       Review of Systems   Constitutional symptoms: Denies generalized fatigue.  Denies weight change, fevers/chills, difficulty sleeping   Eyes: Denies double vision, glaucoma, cataracts.  Ear/nose/throat/mouth: Denies hearing changes, sore throat, sinus problems.  Cardiovascular: No chest pain.  Denies irregular heartbeat.  Denies ankle swelling.  Respiratory: No wheezing, cough, or shortness of breath.  Gastrointestinal: No abdominal pain,  No nausea/vomiting.  No indigestion/heartburn.  No change in bowel habits.  No constipation or diarrhea.   Genitourinary: No urinary incontinence.  No urinary frequency.  No painful urination.  Musculoskeletal: No bone pain, no muscle pain, no joint pain.   Integumentary: No rash. No masses.  No changes in moles.  No easy bruising.  Neurological: No headaches.  No tremors. No numbness/tingling.  Psychiatric: No anxiety. No depression.  Endocrine: No excessive thirst.  Not too hot or too cold.  Not tired or fatigued.    Hematological/lymphatic: No swollen glands or blood clotting problems.  No bruising.    PHYSICAL EXAM:    General: Alert and oriented x 3.  Mood and affect are appropriate.  HEENT: EOMI, PERRLA.   Neck: supple, no masses, no cervical adenopathy.  Cardiovascular: no lower extremity edema.  Regular rhythm.  Pulmonary: breathing non labored on room air.  Clear to auscultation.  GI: Abdomen soft, no masses. No hepatomegaly or splenomegaly.  Lymph nodes: No supraclavicular or axillary adenopathy bilaterally.  Musculoskeletal: Full range of motion in the upper extremities bilaterally.  Neuro: denies dizziness, tremors  Physical Exam  Chest:          Comments: There is no cervical, supraclavicular, or axillary lymphadenopathy.  The breasts are symmetric bilaterally. In the left breast, there are no dominant masses, no  skin changes, and no nipple discharge. In the right breast, there is a 1.5 cm mass in the upper outer quadrant which is firm and mobile.  No skin changes.  No nipple discharge.        Assessment/Plan     Palpable right breast mass and abnormal right breast ultrasound  1.5 cm mass in the 10 o'clock position, 12 cm from the nipple  Your ultrasound showed abnormal findings. We have scheduled an ultrasound-guided core biopsy. This is a needle biopsy that will be done by a radiologist in our [facility]. Possible results are benign, atypical or cancer.   Bruising and mild discomfort after the biopsy are normal and will improve.   I will call you after the biopsy with the results. If you did not hear from me within 7 days of the procedure, please call the office for the results.   I will provide further recommendations based on the results of the biopsy.    The risks, benefits, and procedures were reviewed with the patient. I will call her when the results are available.   She understands that if the pathology results show any atypical, abnormal or cancer cells, she will need additional surgery.     The biopsy is scheduled for 4/1/2024 at [facility].      Marialuisa Khan MD

## 2024-03-26 ENCOUNTER — PROCEDURE VISIT (OUTPATIENT)
Dept: SURGICAL ONCOLOGY | Facility: CLINIC | Age: 44
End: 2024-03-26
Payer: COMMERCIAL

## 2024-03-26 VITALS
DIASTOLIC BLOOD PRESSURE: 76 MMHG | HEART RATE: 109 BPM | BODY MASS INDEX: 24.81 KG/M2 | WEIGHT: 167.5 LBS | HEIGHT: 69 IN | TEMPERATURE: 97.9 F | SYSTOLIC BLOOD PRESSURE: 115 MMHG | RESPIRATION RATE: 18 BRPM

## 2024-03-26 DIAGNOSIS — R92.8 ABNORMAL FINDINGS ON DIAGNOSTIC IMAGING OF BREAST: Primary | ICD-10-CM

## 2024-03-26 DIAGNOSIS — N63.11 MASS OF UPPER OUTER QUADRANT OF RIGHT BREAST: ICD-10-CM

## 2024-03-26 PROCEDURE — 99204 OFFICE O/P NEW MOD 45 MIN: CPT | Performed by: SURGERY

## 2024-03-26 PROCEDURE — 99214 OFFICE O/P EST MOD 30 MIN: CPT | Performed by: SURGERY

## 2024-03-26 ASSESSMENT — PAIN SCALES - GENERAL: PAINLEVEL: 0-NO PAIN

## 2024-03-26 ASSESSMENT — COLUMBIA-SUICIDE SEVERITY RATING SCALE - C-SSRS
1. IN THE PAST MONTH, HAVE YOU WISHED YOU WERE DEAD OR WISHED YOU COULD GO TO SLEEP AND NOT WAKE UP?: NO
2. HAVE YOU ACTUALLY HAD ANY THOUGHTS OF KILLING YOURSELF?: NO
6. HAVE YOU EVER DONE ANYTHING, STARTED TO DO ANYTHING, OR PREPARED TO DO ANYTHING TO END YOUR LIFE?: NO

## 2024-03-26 ASSESSMENT — ENCOUNTER SYMPTOMS
LOSS OF SENSATION IN FEET: 0
DEPRESSION: 0
OCCASIONAL FEELINGS OF UNSTEADINESS: 0

## 2024-03-26 NOTE — PATIENT INSTRUCTIONS
Breast Core Biopsy pamphlet given to patient.  Dr Khan will call you when your biopsy pathology results are available. Follow-up as indicated.

## 2024-04-01 ENCOUNTER — HOSPITAL ENCOUNTER (OUTPATIENT)
Dept: RADIOLOGY | Facility: HOSPITAL | Age: 44
Discharge: HOME | End: 2024-04-01
Payer: COMMERCIAL

## 2024-04-01 DIAGNOSIS — R92.8 OTHER ABNORMAL AND INCONCLUSIVE FINDINGS ON DIAGNOSTIC IMAGING OF BREAST: ICD-10-CM

## 2024-04-01 PROCEDURE — 77065 DX MAMMO INCL CAD UNI: CPT | Mod: RT

## 2024-04-01 PROCEDURE — 19083 BX BREAST 1ST LESION US IMAG: CPT | Mod: RT

## 2024-04-01 PROCEDURE — 2720000003 HC TRAY FOLEY SILER W URIMETER

## 2024-04-01 PROCEDURE — 19083 BX BREAST 1ST LESION US IMAG: CPT | Mod: RIGHT SIDE | Performed by: RADIOLOGY

## 2024-04-01 PROCEDURE — 88341 IMHCHEM/IMCYTCHM EA ADD ANTB: CPT | Performed by: STUDENT IN AN ORGANIZED HEALTH CARE EDUCATION/TRAINING PROGRAM

## 2024-04-01 PROCEDURE — 88305 TISSUE EXAM BY PATHOLOGIST: CPT | Performed by: STUDENT IN AN ORGANIZED HEALTH CARE EDUCATION/TRAINING PROGRAM

## 2024-04-01 PROCEDURE — 2720000007 HC OR 272 NO HCPCS

## 2024-04-01 PROCEDURE — 88342 IMHCHEM/IMCYTCHM 1ST ANTB: CPT | Performed by: STUDENT IN AN ORGANIZED HEALTH CARE EDUCATION/TRAINING PROGRAM

## 2024-04-01 PROCEDURE — C1819 TISSUE LOCALIZATION-EXCISION: HCPCS

## 2024-04-01 PROCEDURE — A4648 IMPLANTABLE TISSUE MARKER: HCPCS

## 2024-04-01 PROCEDURE — 88305 TISSUE EXAM BY PATHOLOGIST: CPT | Mod: TC,GEALAB | Performed by: SURGERY

## 2024-04-01 PROCEDURE — 77065 DX MAMMO INCL CAD UNI: CPT | Mod: RIGHT SIDE | Performed by: RADIOLOGY

## 2024-04-01 NOTE — DISCHARGE INSTRUCTIONS
Okay to remove band-aid tomorrow  Okay to shower tomorrow  Steri-strips will fall off on own  Okay to use ice as needed for pain  Okay to take tylenol as needed for pain  Follow up with ordering physician in 10-14 days for results  Resume normal diet  Notify MD with any s/s of infection or active bleeding

## 2024-04-05 ENCOUNTER — TELEPHONE (OUTPATIENT)
Dept: SURGERY | Facility: HOSPITAL | Age: 44
End: 2024-04-05
Payer: COMMERCIAL

## 2024-04-05 LAB
LAB AP ASR DISCLAIMER: NORMAL
LABORATORY COMMENT REPORT: NORMAL
PATH REPORT.FINAL DX SPEC: NORMAL
PATH REPORT.GROSS SPEC: NORMAL
PATH REPORT.TOTAL CANCER: NORMAL

## 2024-04-05 NOTE — TELEPHONE ENCOUNTER
Patient was called with pathology results.  Message was left on voicemail the pathology results are benign.

## 2024-04-09 ENCOUNTER — TELEPHONE (OUTPATIENT)
Dept: SURGICAL ONCOLOGY | Facility: CLINIC | Age: 44
End: 2024-04-09
Payer: COMMERCIAL

## 2024-04-09 NOTE — TELEPHONE ENCOUNTER
"Result Communication    Resulted Orders   Surgical Pathology Exam   Result Value Ref Range    Case Report       Surgical Pathology                                Case: [id number]                                  Authorizing Provider:  Marialuisa Khan MD            Collected:           04/01/2024 1407              Ordering Location:     [facility]   Received:            04/01/2024 1440              Pathologist:           Chris Nolasco MD                                                           Specimen:    BREAST CORE BIOPSY RIGHT, right breast biopsy                                              FINAL DIAGNOSIS       A.  Breast, right, core biopsy:  --Breast parenchyma with pseudoangiomatous stromal hyperplasia, usual ductal hyperplasia and columnar cell change    Note: Immunohistochemical studies have been performed.  CK5/6 is positive in usual ductal hyperplasia.  Estrogen receptor shows mosaic pattern of expression and usual ductal hyperplasia.              By the signature on this report, the individual or group listed as making the Final Interpretation/Diagnosis certifies that they have reviewed this case.       Gross Description       Received in formalin, labeled with the patient´s name and hospital number and \" right breast biopsy\", are multiple irregular/cylindrical segments of yellow-white fatty soft tissue aggregating to 1.8 x 0.5 x 0.2 cm.The specimen is submitted in toto in 1 cassette.  [facility]    NOTE:    Ischemia time: not provided.  This specimen was placed into formalin at: 1407 4/1/2024.      Disclaimer       One or more of the reagents used to perform assays on this specimen MAY have contained components considered to be analyte specific reagents (ASR's).  ASR's have not been cleared or approved by the U.S. Food and Drug Administration.  These assays were developed and their performance characteristics determined by the Department of Pathology at [facility]" [place]. The FDA does not require this test to go through premarket FDA review. This test is used for clinical purposes. It should not be regarded as investigational or for research. This laboratory is certified under the Clinical Laboratory Improvement Amendments (CLIA) as qualified to perform high complexity clinical laboratory testing.  The assays were performed with appropriate positive and negative controls which stained appropriately.         9:23 AM    The patient was called with the pathology results.  A message was left on voicemail.    Biopsy results are benign (no cancer). Continue yearly mammograms.

## 2024-12-24 ENCOUNTER — TELEPHONE (OUTPATIENT)
Dept: PRIMARY CARE | Facility: CLINIC | Age: 44
End: 2024-12-24
Payer: COMMERCIAL

## 2024-12-24 DIAGNOSIS — Z00.00 ANNUAL PHYSICAL EXAM: ICD-10-CM

## 2025-01-10 ENCOUNTER — OFFICE VISIT (OUTPATIENT)
Dept: URGENT CARE | Age: 45
End: 2025-01-10
Payer: COMMERCIAL

## 2025-01-10 VITALS
TEMPERATURE: 98.5 F | SYSTOLIC BLOOD PRESSURE: 128 MMHG | DIASTOLIC BLOOD PRESSURE: 88 MMHG | BODY MASS INDEX: 23.4 KG/M2 | HEIGHT: 69 IN | HEART RATE: 88 BPM | WEIGHT: 158 LBS | RESPIRATION RATE: 16 BRPM | OXYGEN SATURATION: 100 %

## 2025-01-10 DIAGNOSIS — R06.2 WHEEZE: ICD-10-CM

## 2025-01-10 DIAGNOSIS — R11.11 VOMITING WITHOUT NAUSEA, UNSPECIFIED VOMITING TYPE: ICD-10-CM

## 2025-01-10 DIAGNOSIS — J40 BRONCHITIS: Primary | ICD-10-CM

## 2025-01-10 DIAGNOSIS — R10.9 ABDOMINAL PAIN, UNSPECIFIED ABDOMINAL LOCATION: ICD-10-CM

## 2025-01-10 LAB
POC RAPID INFLUENZA A: NEGATIVE
POC RAPID INFLUENZA B: NEGATIVE

## 2025-01-10 RX ORDER — AZITHROMYCIN 250 MG/1
TABLET, FILM COATED ORAL
Qty: 6 TABLET | Refills: 0 | Status: SHIPPED | OUTPATIENT
Start: 2025-01-10 | End: 2025-01-15

## 2025-01-10 RX ORDER — DICYCLOMINE HYDROCHLORIDE 10 MG/1
10 CAPSULE ORAL 3 TIMES DAILY PRN
Qty: 15 CAPSULE | Refills: 0 | Status: SHIPPED | OUTPATIENT
Start: 2025-01-10 | End: 2026-01-10

## 2025-01-10 RX ORDER — PREDNISONE 20 MG/1
20 TABLET ORAL 2 TIMES DAILY
Qty: 10 TABLET | Refills: 0 | Status: SHIPPED | OUTPATIENT
Start: 2025-01-10 | End: 2025-01-15

## 2025-01-10 RX ORDER — ALBUTEROL SULFATE 90 UG/1
2 INHALANT RESPIRATORY (INHALATION) EVERY 4 HOURS PRN
Qty: 6.7 G | Refills: 0 | Status: SHIPPED | OUTPATIENT
Start: 2025-01-10 | End: 2026-01-10

## 2025-01-10 RX ORDER — ONDANSETRON 4 MG/1
4 TABLET, ORALLY DISINTEGRATING ORAL EVERY 8 HOURS PRN
Qty: 10 TABLET | Refills: 0 | Status: SHIPPED | OUTPATIENT
Start: 2025-01-10

## 2025-01-10 ASSESSMENT — ENCOUNTER SYMPTOMS
COUGH: 1
FATIGUE: 1
WHEEZING: 1
SINUS COMPLAINT: 1
RHINORRHEA: 1
SINUS PAIN: 1
NEUROLOGICAL NEGATIVE: 1
ENDOCRINE NEGATIVE: 1
NAUSEA: 1
VOMITING: 1
MUSCULOSKELETAL NEGATIVE: 1
DIARRHEA: 1
ALLERGIC/IMMUNOLOGIC NEGATIVE: 1
PSYCHIATRIC NEGATIVE: 1
CARDIOVASCULAR NEGATIVE: 1
SINUS PRESSURE: 1
HEMATOLOGIC/LYMPHATIC NEGATIVE: 1
EYES NEGATIVE: 1

## 2025-01-10 NOTE — PROGRESS NOTES
Subjective   Patient ID: Vandana Rankin is a 44 y.o. female. They present today with a chief complaint of Cough, Sinus Problem (Sinus pressure x 2 months), Vomiting, and Diarrhea (X 4 days).    History of Present Illness    History provided by:  Patient   used: No    Cough  This is a new problem. The current episode started 1 to 4 weeks ago. The problem has been gradually worsening. The cough is Productive of brown sputum. Associated symptoms include ear pain, nasal congestion, postnasal drip, rhinorrhea and wheezing. The symptoms are aggravated by lying down. She has tried OTC cough suppressant for the symptoms. The treatment provided no relief.   Sinus Problem  Associated symptoms: cough, diarrhea, ear pain, fatigue, nausea, rhinorrhea, vomiting and wheezing    Vomiting  Severity:  Moderate  Duration:  4 days  Timing:  Intermittent  Quality:  Unable to specify  Progression:  Improving  Chronicity:  New  Recent urination:  Normal  Relieved by:  Nothing  Associated symptoms: cough and diarrhea    Risk factors: sick contacts    Diarrhea  Associated symptoms: vomiting        Past Medical History  Allergies as of 01/10/2025    (No Known Allergies)       (Not in a hospital admission)       No past medical history on file.    Past Surgical History:   Procedure Laterality Date    BREAST CYST ASPIRATION Right      SECTION, LOW TRANSVERSE          reports that she quit smoking about 4 years ago. Her smoking use included cigarettes. She has been exposed to tobacco smoke. She has never used smokeless tobacco. She reports current alcohol use of about 2.0 standard drinks of alcohol per week. She reports that she does not currently use drugs.    Review of Systems  Review of Systems   Constitutional:  Positive for fatigue.   HENT:  Positive for ear pain, postnasal drip, rhinorrhea, sinus pressure and sinus pain.    Eyes: Negative.    Respiratory:  Positive for cough and wheezing.    Cardiovascular:  "Negative.    Gastrointestinal:  Positive for diarrhea, nausea and vomiting.   Endocrine: Negative.    Genitourinary: Negative.    Musculoskeletal: Negative.    Skin: Negative.    Allergic/Immunologic: Negative.    Neurological: Negative.    Hematological: Negative.    Psychiatric/Behavioral: Negative.                                    Objective    Vitals:    01/10/25 1553   BP: 128/88   BP Location: Left arm   Patient Position: Sitting   BP Cuff Size: Adult   Pulse: 88   Resp: 16   Temp: 36.9 °C (98.5 °F)   TempSrc: Oral   SpO2: 100%   Weight: 71.7 kg (158 lb)   Height: 1.753 m (5' 9\")     Patient's last menstrual period was 12/30/2024.    Physical Exam  Constitutional:       Appearance: She is ill-appearing.   HENT:      Right Ear: Tympanic membrane is erythematous.      Left Ear: Tympanic membrane is erythematous.      Nose: Nasal tenderness, mucosal edema, congestion and rhinorrhea present. Rhinorrhea is purulent.      Right Sinus: Maxillary sinus tenderness present.      Left Sinus: Maxillary sinus tenderness present.   Cardiovascular:      Rate and Rhythm: Normal rate and regular rhythm.   Pulmonary:      Effort: Pulmonary effort is normal.      Breath sounds: Examination of the right-lower field reveals wheezing. Examination of the left-lower field reveals wheezing. Wheezing present.   Abdominal:      General: Bowel sounds are normal.      Palpations: Abdomen is soft.   Musculoskeletal:         General: Normal range of motion.      Cervical back: Normal range of motion.   Skin:     General: Skin is warm.   Neurological:      General: No focal deficit present.      Mental Status: She is alert and oriented to person, place, and time. Mental status is at baseline.   Psychiatric:         Mood and Affect: Mood normal.         Behavior: Behavior normal.         Thought Content: Thought content normal.         Judgment: Judgment normal.         Procedures    Point of Care Test & Imaging Results from this " visit  Results for orders placed or performed in visit on 01/10/25   POCT Influenza A/B manually resulted   Result Value Ref Range    POC Rapid Influenza A Negative Negative    POC Rapid Influenza B Negative Negative      No results found.    Diagnostic study results (if any) were reviewed by ANDREA Vacne.    Assessment/Plan   Allergies, medications, history, and pertinent labs/EKGs/Imaging reviewed by ANDREA Vance.     Medical Decision Making  Medical Decision Making  At time of discharge patient was clinically well-appearing and HDS for outpatient management. The patient and/or family was educated regarding diagnosis, supportive care, OTC and Rx medications. The patient and/or family was given the opportunity to ask questions prior to discharge.  They verbalized understanding of my discussion of the plans for treatment, expected course, indications to return to  or seek further evaluation in ED, and the need for timely follow up as directed.   They were provided with a work/school excuse if requested.        Orders and Diagnoses  Diagnoses and all orders for this visit:  Bronchitis  -     albuterol (Proventil HFA) 90 mcg/actuation inhaler; Inhale 2 puffs every 4 hours if needed for wheezing or shortness of breath.  -     azithromycin (Zithromax) 250 mg tablet; Take 2 tabs (500 mg) by mouth today, than 1 daily for 4 days.  Vomiting without nausea, unspecified vomiting type  -     POCT Influenza A/B manually resulted  -     ondansetron ODT (Zofran-ODT) 4 mg disintegrating tablet; Dissolve 1 tablet (4 mg) in the mouth every 8 hours if needed for nausea or vomiting for up to 10 doses.  -     dicyclomine (Bentyl) 10 mg capsule; Take 1 capsule (10 mg) by mouth 3 times a day as needed (abdominal pain).  Wheeze  -     albuterol (Proventil HFA) 90 mcg/actuation inhaler; Inhale 2 puffs every 4 hours if needed for wheezing or shortness of breath.  -     predniSONE (Deltasone) 20 mg tablet;  Take 1 tablet (20 mg) by mouth 2 times a day for 5 days.  Abdominal pain, unspecified abdominal location  -     dicyclomine (Bentyl) 10 mg capsule; Take 1 capsule (10 mg) by mouth 3 times a day as needed (abdominal pain).      Return to Urgent care if symptoms return or progress  Follow up with PCP in 1-2 weeks       Patient disposition: Home    Electronically signed by VALENTIN Vance-JOAN  4:56 PM

## 2025-01-17 ENCOUNTER — TELEPHONE (OUTPATIENT)
Dept: PRIMARY CARE | Facility: CLINIC | Age: 45
End: 2025-01-17

## 2025-01-17 ENCOUNTER — APPOINTMENT (OUTPATIENT)
Dept: PRIMARY CARE | Facility: CLINIC | Age: 45
End: 2025-01-17
Payer: COMMERCIAL

## 2025-01-17 VITALS
BODY MASS INDEX: 25.1 KG/M2 | HEART RATE: 93 BPM | SYSTOLIC BLOOD PRESSURE: 124 MMHG | DIASTOLIC BLOOD PRESSURE: 78 MMHG | OXYGEN SATURATION: 96 % | WEIGHT: 170 LBS

## 2025-01-17 DIAGNOSIS — Z00.00 ROUTINE GENERAL MEDICAL EXAMINATION AT A HEALTH CARE FACILITY: ICD-10-CM

## 2025-01-17 DIAGNOSIS — N94.6 DYSMENORRHEA: Primary | ICD-10-CM

## 2025-01-17 DIAGNOSIS — Z86.018 HISTORY OF UTERINE FIBROID: ICD-10-CM

## 2025-01-17 DIAGNOSIS — R05.1 ACUTE COUGH: ICD-10-CM

## 2025-01-17 PROCEDURE — 1036F TOBACCO NON-USER: CPT

## 2025-01-17 PROCEDURE — 99213 OFFICE O/P EST LOW 20 MIN: CPT

## 2025-01-17 RX ORDER — IBUPROFEN 800 MG/1
800 TABLET ORAL EVERY 8 HOURS PRN
Qty: 90 TABLET | Refills: 2 | Status: SHIPPED | OUTPATIENT
Start: 2025-01-17

## 2025-01-17 RX ORDER — IBUPROFEN 800 MG/1
800 TABLET ORAL EVERY 8 HOURS PRN
COMMUNITY
End: 2025-01-17 | Stop reason: SDUPTHER

## 2025-01-17 ASSESSMENT — PATIENT HEALTH QUESTIONNAIRE - PHQ9
1. LITTLE INTEREST OR PLEASURE IN DOING THINGS: NOT AT ALL
SUM OF ALL RESPONSES TO PHQ9 QUESTIONS 1 AND 2: 0
2. FEELING DOWN, DEPRESSED OR HOPELESS: NOT AT ALL

## 2025-01-17 ASSESSMENT — PAIN SCALES - GENERAL: PAINLEVEL_OUTOF10: 0-NO PAIN

## 2025-01-17 NOTE — PROGRESS NOTES
Subjective     Patient ID: Vandana Rankin is a 44 y.o. female who presents for Follow-up (Needs a refill of motrin ).      HPI  Vandana presents for follow up follow up of menstrual cramping.  Typically taking ibuprofen 800 mg every 8 hours x 4 days during her menses for cramping.  Does also have history of uterine fibroids. Would like referral to GYN for further intervention options.     She also has concerns for continued cough.  Was seen at  on 1/10/2025, treated for bronchitis.  Treated with prednisone and azithromycin- completed full course of medication. Admits she does feel much better but continues to experience persistent non-productive cough. Denies chest pain, SOB or wheezing.     Patient's recent visit notes, medication and allergy lists, past medical surgical social hx, immunization, vitals, problem list, recent tests were reviewed by me for pertinence to this visit.    Current Outpatient Medications:     albuterol (Proventil HFA) 90 mcg/actuation inhaler, Inhale 2 puffs every 4 hours if needed for wheezing or shortness of breath., Disp: 6.7 g, Rfl: 0    aspirin 81 mg EC tablet, Take 1 tablet (81 mg) by mouth once daily., Disp: 90 tablet, Rfl: 3    esomeprazole (NexIUM) 40 mg DR capsule, Take 1 capsule (40 mg) by mouth once every 24 hours., Disp: 90 capsule, Rfl: 3    ibuprofen 800 mg tablet, Take 1 tablet (800 mg) by mouth every 8 hours if needed for mild pain (1 - 3)., Disp: , Rfl:     metoprolol succinate XL (Toprol-XL) 25 mg 24 hr tablet, Take 1 tablet (25 mg) by mouth 2 times a day., Disp: 180 tablet, Rfl: 3    dicyclomine (Bentyl) 10 mg capsule, Take 1 capsule (10 mg) by mouth 3 times a day as needed (abdominal pain). (Patient not taking: Reported on 1/17/2025), Disp: 15 capsule, Rfl: 0    ondansetron ODT (Zofran-ODT) 4 mg disintegrating tablet, Dissolve 1 tablet (4 mg) in the mouth every 8 hours if needed for nausea or vomiting for up to 10 doses. (Patient not taking: Reported on 1/17/2025), Disp: 10  tablet, Rfl: 0      Review of Systems  All other systems have been reviewed and are negative except as noted in the HPI.         Objective   /78 (BP Location: Left arm)   Pulse 93   Wt 77.1 kg (170 lb)   LMP 12/30/2024   SpO2 96%   BMI 25.10 kg/m²       Physical Exam  Vitals and nursing note reviewed.   Constitutional:       General: She is not in acute distress.     Appearance: Normal appearance. She is normal weight.   HENT:      Right Ear: Tympanic membrane, ear canal and external ear normal.      Left Ear: Tympanic membrane, ear canal and external ear normal.      Nose: Nose normal.      Mouth/Throat:      Mouth: Mucous membranes are moist.      Pharynx: Oropharynx is clear. No oropharyngeal exudate or posterior oropharyngeal erythema.   Cardiovascular:      Rate and Rhythm: Normal rate and regular rhythm.      Pulses: Normal pulses.      Heart sounds: Normal heart sounds.   Pulmonary:      Effort: Pulmonary effort is normal. No respiratory distress.      Breath sounds: Normal breath sounds. No wheezing or rhonchi.   Abdominal:      General: Abdomen is flat. Bowel sounds are normal.      Palpations: Abdomen is soft.      Tenderness: There is no abdominal tenderness.   Musculoskeletal:      Cervical back: Normal range of motion and neck supple.   Lymphadenopathy:      Cervical: No cervical adenopathy.   Skin:     General: Skin is warm and dry.   Neurological:      General: No focal deficit present.      Mental Status: She is alert.             Assessment & Plan  Dysmenorrhea  Chronic condition  May continue with ibuprofen 800 mg prn during menses as discussed  Referral to new gynecologist given- has not seen gyn in several years prior to moving to this area.   Orders:    ibuprofen 800 mg tablet; Take 1 tablet (800 mg) by mouth every 8 hours if needed for mild pain (1 - 3).    Referral to Gynecology; Future    History of uterine fibroid    Orders:    Referral to Gynecology; Future    Acute cough  Acute,  bronchitis resolved  Discussed cough may persist x 4-6 weeks.   May continue with OTC cough suppressants.   Strict return instructions discussed if cough worsens, any difficulty breathing or fever. Patient voiced understanding.                Patient understands and agrees with treatment plan.    Piper Perera, VALENTIN-CNP

## 2025-03-17 PROCEDURE — 88175 CYTOPATH C/V AUTO FLUID REDO: CPT

## 2025-03-17 PROCEDURE — 87624 HPV HI-RISK TYP POOLED RSLT: CPT

## 2025-03-18 ENCOUNTER — LAB REQUISITION (OUTPATIENT)
Dept: LAB | Facility: HOSPITAL | Age: 45
End: 2025-03-18
Payer: COMMERCIAL

## 2025-03-18 ENCOUNTER — APPOINTMENT (OUTPATIENT)
Dept: PRIMARY CARE | Facility: CLINIC | Age: 45
End: 2025-03-18
Payer: COMMERCIAL

## 2025-03-18 DIAGNOSIS — Z12.4 ENCOUNTER FOR SCREENING FOR MALIGNANT NEOPLASM OF CERVIX: ICD-10-CM

## 2025-03-18 DIAGNOSIS — Z11.51 ENCOUNTER FOR SCREENING FOR HUMAN PAPILLOMAVIRUS (HPV): ICD-10-CM

## 2025-03-18 DIAGNOSIS — Z01.419 ENCOUNTER FOR GYNECOLOGICAL EXAMINATION (GENERAL) (ROUTINE) WITHOUT ABNORMAL FINDINGS: ICD-10-CM

## 2025-03-31 LAB
CYTOLOGY CMNT CVX/VAG CYTO-IMP: NORMAL
HPV HR 12 DNA GENITAL QL NAA+PROBE: NEGATIVE
HPV HR GENOTYPES PNL CVX NAA+PROBE: NEGATIVE
HPV16 DNA SPEC QL NAA+PROBE: NEGATIVE
HPV18 DNA SPEC QL NAA+PROBE: NEGATIVE
LAB AP HPV GENOTYPE QUESTION: YES
LAB AP HPV HR: NORMAL
LABORATORY COMMENT REPORT: NORMAL
PATH REPORT.TOTAL CANCER: NORMAL

## 2025-05-23 ENCOUNTER — APPOINTMENT (OUTPATIENT)
Dept: PRIMARY CARE | Facility: CLINIC | Age: 45
End: 2025-05-23
Payer: COMMERCIAL

## 2025-05-30 DIAGNOSIS — I48.0 PAF (PAROXYSMAL ATRIAL FIBRILLATION) (MULTI): ICD-10-CM

## 2025-05-30 DIAGNOSIS — K21.9 GASTROESOPHAGEAL REFLUX DISEASE WITHOUT ESOPHAGITIS: ICD-10-CM

## 2025-05-30 RX ORDER — ASPIRIN 81 MG/1
81 TABLET ORAL DAILY
Qty: 90 TABLET | Refills: 0 | Status: SHIPPED | OUTPATIENT
Start: 2025-05-30

## 2025-05-30 RX ORDER — ESOMEPRAZOLE MAGNESIUM 40 MG/1
CAPSULE, DELAYED RELEASE ORAL
Qty: 90 CAPSULE | Refills: 0 | Status: SHIPPED | OUTPATIENT
Start: 2025-05-30

## 2025-05-30 RX ORDER — METOPROLOL SUCCINATE 25 MG/1
25 TABLET, EXTENDED RELEASE ORAL 2 TIMES DAILY
Qty: 180 TABLET | Refills: 0 | Status: SHIPPED | OUTPATIENT
Start: 2025-05-30

## 2025-06-10 ENCOUNTER — APPOINTMENT (OUTPATIENT)
Dept: PRIMARY CARE | Facility: CLINIC | Age: 45
End: 2025-06-10
Payer: COMMERCIAL

## 2025-06-10 VITALS
WEIGHT: 167 LBS | HEART RATE: 94 BPM | TEMPERATURE: 97.8 F | OXYGEN SATURATION: 99 % | BODY MASS INDEX: 24.73 KG/M2 | SYSTOLIC BLOOD PRESSURE: 122 MMHG | DIASTOLIC BLOOD PRESSURE: 80 MMHG | HEIGHT: 69 IN

## 2025-06-10 DIAGNOSIS — Z79.899 HIGH RISK MEDICATION USE: ICD-10-CM

## 2025-06-10 DIAGNOSIS — F41.8 SITUATIONAL ANXIETY: Primary | ICD-10-CM

## 2025-06-10 PROBLEM — I49.3 PVC (PREMATURE VENTRICULAR CONTRACTION): Status: ACTIVE | Noted: 2022-01-04

## 2025-06-10 PROBLEM — N60.29 FIBROADENOSIS OF BREAST: Status: ACTIVE | Noted: 2025-06-10

## 2025-06-10 PROBLEM — N94.6 CRAMPY PAIN ASSOCIATED WITH MENSES: Status: ACTIVE | Noted: 2025-06-10

## 2025-06-10 PROCEDURE — 1036F TOBACCO NON-USER: CPT

## 2025-06-10 PROCEDURE — 3008F BODY MASS INDEX DOCD: CPT

## 2025-06-10 PROCEDURE — G8433 SCR FOR DEP NOT CPT DOC RSN: HCPCS

## 2025-06-10 PROCEDURE — 99214 OFFICE O/P EST MOD 30 MIN: CPT

## 2025-06-10 RX ORDER — ALPRAZOLAM 0.5 MG/1
0.5 TABLET ORAL 3 TIMES DAILY PRN
Qty: 30 TABLET | Refills: 0 | Status: SHIPPED | OUTPATIENT
Start: 2025-06-10 | End: 2026-02-05

## 2025-06-10 ASSESSMENT — ANXIETY QUESTIONNAIRES
1. FEELING NERVOUS, ANXIOUS, OR ON EDGE: NOT AT ALL
7. FEELING AFRAID AS IF SOMETHING AWFUL MIGHT HAPPEN: SEVERAL DAYS
3. WORRYING TOO MUCH ABOUT DIFFERENT THINGS: NOT AT ALL
4. TROUBLE RELAXING: NOT AT ALL
GAD7 TOTAL SCORE: 2
2. NOT BEING ABLE TO STOP OR CONTROL WORRYING: NOT AT ALL
5. BEING SO RESTLESS THAT IT IS HARD TO SIT STILL: SEVERAL DAYS
IF YOU CHECKED OFF ANY PROBLEMS ON THIS QUESTIONNAIRE, HOW DIFFICULT HAVE THESE PROBLEMS MADE IT FOR YOU TO DO YOUR WORK, TAKE CARE OF THINGS AT HOME, OR GET ALONG WITH OTHER PEOPLE: NOT DIFFICULT AT ALL
6. BECOMING EASILY ANNOYED OR IRRITABLE: NOT AT ALL

## 2025-06-10 ASSESSMENT — PATIENT HEALTH QUESTIONNAIRE - PHQ9
1. LITTLE INTEREST OR PLEASURE IN DOING THINGS: NOT AT ALL
2. FEELING DOWN, DEPRESSED OR HOPELESS: NOT AT ALL
SUM OF ALL RESPONSES TO PHQ9 QUESTIONS 1 AND 2: 0

## 2025-06-10 NOTE — PROGRESS NOTES
Subjective     Patient ID: Vandana Rankin is a 45 y.o. female who presents for Medication Visit.      HPI  Vandana presents for concerns of panic with heights which was first noticed last year while on vacation.  She is going to [place] and [place] this summer and is concerned for panic with heights during those trips. She admits these are new fears for her, is open to seeing counselor for future.     OARRS:  Piper Perera, APRN-CNP on 6/10/2025  8:48 AM  I have personally reviewed the OARRS report for Vandana Rankin. I have considered the risks of abuse, dependence, addiction and diversion    Is the patient prescribed a combination of a benzodiazepine and opioid?  No    Last Urine Drug Screen / ordered today: Yes  No results found for this or any previous visit (from the past 8760 hours).  N/A    Controlled Substance Agreement:  Date of the Last Agreement: 6/10/2025  Reviewed Controlled Substance Agreement including but not limited to the benefits, risks, and alternatives to treatment with a Controlled Substance medication(s).    Benzodiazepines:  What is the patient's goal of therapy? Decrease situational anxiety/panic associated with fear of heights      CHERYL-7:  Over the last 2 weeks, how often have you been bothered by any of the following problems?  Feeling nervous, anxious, or on edge: 0  Not being able to stop or control worryin  Worrying too much about different things: 0  Trouble relaxin  Being so restless that it is hard to sit still: 1  Becoming easily annoyed or irritable: 0  Feeling afraid as if something awful might happen: 1  CHERYL-7 Total Score: 2        Patient's recent visit notes, medication and allergy lists, past medical surgical social hx, immunization, vitals, problem list, recent tests were reviewed by me for pertinence to this visit.        Review of Systems  All other systems have been reviewed and are negative except as noted in the HPI.         Objective   /80 (BP Location: Left arm,  "Patient Position: Sitting, BP Cuff Size: Adult)   Pulse 94   Temp 36.6 °C (97.8 °F) (Temporal)   Ht 1.753 m (5' 9\")   Wt 75.8 kg (167 lb)   SpO2 99%   BMI 24.66 kg/m²       Physical Exam  Nursing notes and vitals reviewed  General appearance - AAOx3, non distressed  CVS - Warm and well perfused  RESP - No respiratory distress  PSYCH - Normal thought content, fluent and appropriate speech        Assessment & Plan  Situational anxiety  Chronic but intermittent concern, needing tighter symptom management  Begin alprazolam 0.5 mg up to TID as needed for situational anxiety. Explained intended effects, potential side effects, and schedule of dosages of the medication.  Follow up Q3 months for evaluation of symptoms and refills. Follow up sooner if symptoms change or worsen.   Orders:    ALPRAZolam (Xanax) 0.5 mg tablet; Take 1 tablet (0.5 mg) by mouth 3 times a day as needed for anxiety.    Drug Screen, Urine With Reflex to Confirmation    High risk medication use    Orders:    Drug Screen, Urine With Reflex to Confirmation      Piper Perera, APRN-CNP  "

## 2025-06-11 LAB
AMPHETAMINES UR QL: NEGATIVE NG/ML
BARBITURATES UR QL: NEGATIVE NG/ML
BENZODIAZ UR QL: NEGATIVE NG/ML
BZE UR QL: NEGATIVE NG/ML
CREAT UR-MCNC: 120.1 MG/DL
FENTANYL UR QL SCN: NEGATIVE NG/ML
METHADONE UR QL: NEGATIVE NG/ML
OPIATES UR QL: NEGATIVE NG/ML
OXIDANTS UR QL: NEGATIVE MCG/ML
OXYCODONE UR QL: NEGATIVE NG/ML
PCP UR QL: NEGATIVE NG/ML
PH UR: 5.4 [PH] (ref 4.5–9)
QUEST NOTES AND COMMENTS: NORMAL
THC UR QL: NEGATIVE NG/ML

## 2025-07-02 ENCOUNTER — APPOINTMENT (OUTPATIENT)
Dept: PRIMARY CARE | Facility: CLINIC | Age: 45
End: 2025-07-02
Payer: COMMERCIAL